# Patient Record
Sex: FEMALE | Race: WHITE | NOT HISPANIC OR LATINO | Employment: UNEMPLOYED | URBAN - NONMETROPOLITAN AREA
[De-identification: names, ages, dates, MRNs, and addresses within clinical notes are randomized per-mention and may not be internally consistent; named-entity substitution may affect disease eponyms.]

---

## 2018-06-11 DIAGNOSIS — R06.02 SOB (SHORTNESS OF BREATH): Primary | ICD-10-CM

## 2018-06-13 ENCOUNTER — OFFICE VISIT (OUTPATIENT)
Dept: PULMONOLOGY | Facility: CLINIC | Age: 58
End: 2018-06-13

## 2018-06-13 VITALS — HEIGHT: 63 IN | BODY MASS INDEX: 31.01 KG/M2 | WEIGHT: 175 LBS

## 2018-06-13 DIAGNOSIS — R04.2 HEMOPTYSIS: Primary | ICD-10-CM

## 2018-06-13 DIAGNOSIS — R91.1 PULMONARY NODULE SEEN ON IMAGING STUDY: ICD-10-CM

## 2018-06-13 DIAGNOSIS — J44.9 CHRONIC OBSTRUCTIVE PULMONARY DISEASE, UNSPECIFIED COPD TYPE (HCC): Primary | ICD-10-CM

## 2018-06-13 LAB
FEV1: 0.81 LITERS
FVC VOL RESPIRATORY: 1.52 LITERS

## 2018-06-13 PROCEDURE — 94010 BREATHING CAPACITY TEST: CPT | Performed by: INTERNAL MEDICINE

## 2018-06-13 PROCEDURE — 99213 OFFICE O/P EST LOW 20 MIN: CPT | Performed by: INTERNAL MEDICINE

## 2018-06-13 ASSESSMENT — PULMONARY FUNCTION TESTS
FVC: 1.52
FEV1: 0.81

## 2018-06-13 NOTE — PROGRESS NOTES
"History of Present Illness 57-year-old lady Referred for evaluation of pulmonary nodule.  She was seen in our office about 4 years ago with COPD and she was advised to quit smoking Take medications that included Advair and Spiriva and return to us in 3 months that she did not 3 significant for smoking at least for 25 years and he quit 12 years ago however started smoking again and currently smokes half a pack a day and she Started coughing up blood for a few days and the daughter the emergency room where they found that nodule in the left upper lobe gave around of Levaquin and referred her to us for further evaluation with her sister      Review of Systems continued cough shortness of breath uses her nebulizer several times a day the Operating Has cleared no chest pain palpitation or edema    Active Problems:  Problem List Items Addressed This Visit     None      Visit Diagnoses     Chronic obstructive pulmonary disease, unspecified COPD type    -  Primary    Pulmonary nodule seen on imaging study              Past Medical History:  No past medical history on file.    Family History:  No family history on file.    Social History:  Social History   Substance Use Topics   • Smoking status: Not on file   • Smokeless tobacco: Not on file   • Alcohol use Not on file       Current Medications:  No current outpatient prescriptions on file.     No current facility-administered medications for this visit.        Allergies:  Allergies not on file    Vitals:  Ht 160 cm (63\")   Wt 79.4 kg (175 lb)   BMI 31.00 kg/m²     Physical Exam chronically ill in no acute distress O2 sat 90 1% expiratory rhonchi heart regular no clubbing cyanosis or edema    Imaging:  The chest x-ray in our system is 4 years ago that was clear for the chest x-ray that she brought on the stated 611 shows a round left upper lobe density that on the CT Is suggestive of infiltrate/nodule    PFT: FVC 47 FEV1 30 3%  No results found for this or any previous " visit.        ASSESSMENT AND DIAGNOSIS:1-COPD severe with recent exacerbation2-left upper lobe infiltrate/nodule that may be The cause of the result of the hemoptysis    Recommendation gave her a sample of Trelegy to take once a day for a month absolutely no smoking seems to be Motivated to quit.  Range for bronchoscopy by Dr. Jasso next week    Follow-Up: Return to the office June 28 with repeat chest x-ray and PFT and the result of a bronchoscopy explained to her sister that she may have lung cancer and certainly And needs to half a bronchoscopy to rule out a bleeding Lesion

## 2018-06-15 ENCOUNTER — HOSPITAL ENCOUNTER (OUTPATIENT)
Facility: HOSPITAL | Age: 58
Setting detail: HOSPITAL OUTPATIENT SURGERY
End: 2018-06-15
Attending: INTERNAL MEDICINE | Admitting: INTERNAL MEDICINE

## 2018-06-15 DIAGNOSIS — R93.89 ABNORMAL CHEST X-RAY: Primary | ICD-10-CM

## 2018-06-18 DIAGNOSIS — R93.89 ABNORMAL CT OF THE CHEST: Primary | ICD-10-CM

## 2018-07-02 DIAGNOSIS — R06.02 SOB (SHORTNESS OF BREATH): Primary | ICD-10-CM

## 2018-07-26 DIAGNOSIS — R06.02 SOB (SHORTNESS OF BREATH): Primary | ICD-10-CM

## 2018-11-02 DIAGNOSIS — R06.02 SOB (SHORTNESS OF BREATH): Primary | ICD-10-CM

## 2018-11-12 DIAGNOSIS — R06.02 SOB (SHORTNESS OF BREATH): Primary | ICD-10-CM

## 2018-11-15 ENCOUNTER — OFFICE VISIT (OUTPATIENT)
Dept: PULMONOLOGY | Facility: CLINIC | Age: 58
End: 2018-11-15

## 2018-11-15 ENCOUNTER — HOSPITAL ENCOUNTER (OUTPATIENT)
Dept: GENERAL RADIOLOGY | Facility: HOSPITAL | Age: 58
Discharge: HOME | End: 2018-11-15
Attending: INTERNAL MEDICINE | Admitting: INTERNAL MEDICINE

## 2018-11-15 VITALS
HEART RATE: 89 BPM | WEIGHT: 165 LBS | BODY MASS INDEX: 29.23 KG/M2 | SYSTOLIC BLOOD PRESSURE: 148 MMHG | TEMPERATURE: 97.3 F | OXYGEN SATURATION: 84 % | HEIGHT: 63 IN | DIASTOLIC BLOOD PRESSURE: 87 MMHG

## 2018-11-15 DIAGNOSIS — J44.9 CHRONIC OBSTRUCTIVE PULMONARY DISEASE, UNSPECIFIED COPD TYPE (HCC): Primary | ICD-10-CM

## 2018-11-15 DIAGNOSIS — J44.9 CHRONIC OBSTRUCTIVE PULMONARY DISEASE, UNSPECIFIED COPD TYPE (HCC): ICD-10-CM

## 2018-11-15 LAB
FEV1: 0.65 LITERS
FVC VOL RESPIRATORY: 1.51 LITERS

## 2018-11-15 PROCEDURE — 71046 X-RAY EXAM CHEST 2 VIEWS: CPT | Performed by: RADIOLOGY

## 2018-11-15 PROCEDURE — 71046 X-RAY EXAM CHEST 2 VIEWS: CPT

## 2018-11-15 PROCEDURE — 94010 BREATHING CAPACITY TEST: CPT | Performed by: INTERNAL MEDICINE

## 2018-11-15 PROCEDURE — 99213 OFFICE O/P EST LOW 20 MIN: CPT | Performed by: INTERNAL MEDICINE

## 2018-11-15 RX ORDER — BUSPIRONE HYDROCHLORIDE 15 MG/1
15 TABLET ORAL 3 TIMES DAILY
COMMUNITY

## 2018-11-15 RX ORDER — HYDROCHLOROTHIAZIDE 25 MG/1
25 TABLET ORAL DAILY
COMMUNITY

## 2018-11-15 RX ORDER — FLUTICASONE PROPIONATE 50 MCG
2 SPRAY, SUSPENSION (ML) NASAL DAILY
COMMUNITY

## 2018-11-15 RX ORDER — GABAPENTIN 600 MG/1
600 TABLET ORAL 3 TIMES DAILY
COMMUNITY

## 2018-11-15 RX ORDER — MONTELUKAST SODIUM 10 MG/1
10 TABLET ORAL NIGHTLY
COMMUNITY

## 2018-11-15 RX ORDER — ALBUTEROL SULFATE 90 UG/1
2 AEROSOL, METERED RESPIRATORY (INHALATION) EVERY 4 HOURS PRN
COMMUNITY

## 2018-11-15 RX ORDER — HYDROCODONE BITARTRATE AND ACETAMINOPHEN 7.5; 325 MG/1; MG/1
1 TABLET ORAL EVERY 6 HOURS PRN
COMMUNITY

## 2018-11-15 RX ORDER — LEVOFLOXACIN 750 MG/1
750 TABLET ORAL DAILY
COMMUNITY

## 2018-11-15 RX ORDER — IBUPROFEN 200 MG
TABLET ORAL
COMMUNITY

## 2018-11-15 RX ORDER — MELOXICAM 7.5 MG/1
15 TABLET ORAL DAILY
COMMUNITY

## 2018-11-15 RX ORDER — PROPRANOLOL HYDROCHLORIDE 10 MG/1
10 TABLET ORAL 2 TIMES DAILY
COMMUNITY

## 2018-11-15 RX ORDER — FENOFIBRATE 54 MG/1
54 TABLET ORAL DAILY
COMMUNITY

## 2018-11-15 RX ORDER — POTASSIUM CHLORIDE 750 MG/1
10 TABLET, FILM COATED, EXTENDED RELEASE ORAL 2 TIMES DAILY
COMMUNITY

## 2018-11-15 RX ORDER — QUETIAPINE FUMARATE 25 MG/1
25 TABLET, FILM COATED ORAL NIGHTLY
COMMUNITY

## 2018-11-15 RX ORDER — ALBUTEROL SULFATE 1.25 MG/3ML
1 SOLUTION RESPIRATORY (INHALATION) EVERY 6 HOURS PRN
COMMUNITY

## 2018-11-15 RX ORDER — METRONIDAZOLE 500 MG/1
500 TABLET ORAL 3 TIMES DAILY
COMMUNITY

## 2018-11-15 RX ORDER — CETIRIZINE HYDROCHLORIDE 10 MG/1
10 TABLET ORAL DAILY
COMMUNITY

## 2018-11-15 RX ORDER — HYDROXYZINE 50 MG/1
50 TABLET, FILM COATED ORAL 3 TIMES DAILY PRN
COMMUNITY

## 2018-11-15 RX ORDER — DULOXETIN HYDROCHLORIDE 60 MG/1
60 CAPSULE, DELAYED RELEASE ORAL DAILY
COMMUNITY

## 2018-11-15 ASSESSMENT — PULMONARY FUNCTION TESTS
FVC: 1.51
FEV1: 0.65

## 2018-11-15 NOTE — PROGRESS NOTES
"History of Present Illness 57-year-old female seen in June OPD) hemoptysis and was scheduled to have a bronchoscopy that did not show up claiming that she ended up having Her appendix removed in July she continues to smoke currently half a pack a day And smoked for 40 years and more than one pack a day for several years and she is taking Advair singular Spiriva and albuterol Inhaler and nebulizer that showed use a couple of times a day.  She uses oxygen at night and during the day.  Into the office to her niece Rosangela      Review of Systems Cough shortness of breath in spite of all the medications and oxygen also having arthritis taking more week For.  Denies cough and \"since June    Active Problems:  Problem List Items Addressed This Visit     None      Visit Diagnoses     Chronic obstructive pulmonary disease, unspecified COPD type (CMS/[facility])    -  Primary    Relevant Medications    fluticasone-salmeterol (ADVAIR) 100-50 MCG/DOSE DISKUS    fluticasone-salmeterol (ADVAIR) 500-50 MCG/DOSE DISKUS    albuterol (ACCUNEB) 1.25 MG/3ML nebulizer solution    cetirizine (zyrTEC) 10 MG tablet    fluticasone (FLONASE) 50 MCG/ACT nasal spray    MethylPREDNISolone (MEDROL, CHRIS, PO)    montelukast (SINGULAIR) 10 MG tablet    albuterol (PROVENTIL HFA;VENTOLIN HFA) 108 (90 Base) MCG/ACT inhaler    Other Relevant Orders    XR Chest 2 View (Completed)    Pulmonary Function Test (Completed)          Past Medical History:  History reviewed. No pertinent past medical history.    Family History:  History reviewed. No pertinent family history.    Social History:  Social History     Tobacco Use   • Smoking status: Current Some Day Smoker     Types: Cigarettes   • Smokeless tobacco: Never Used   Substance Use Topics   • Alcohol use: No     Frequency: Never       Current Medications:  Current Outpatient Medications   Medication Sig Dispense Refill   • albuterol (ACCUNEB) 1.25 MG/3ML nebulizer solution Take 1 ampule by nebulization Every 6 " "(Six) Hours As Needed for Wheezing.     • albuterol (PROVENTIL HFA;VENTOLIN HFA) 108 (90 Base) MCG/ACT inhaler Inhale 2 puffs Every 4 (Four) Hours As Needed for Wheezing.     • busPIRone (BUSPAR) 15 MG tablet Take 15 mg by mouth 3 (Three) Times a Day.     • calcium acetate (PHOSLO) 667 MG capsule Take 1,334 mg by mouth 3 (Three) Times a Day With Meals.     • cetirizine (zyrTEC) 10 MG tablet Take 10 mg by mouth Daily.     • diclofenac (VOLTAREN) 1 % gel gel Apply 4 g topically to the appropriate area as directed 4 (Four) Times a Day As Needed.     • DULoxetine (CYMBALTA) 60 MG capsule Take 60 mg by mouth Daily.     • fenofibrate (TRICOR) 54 MG tablet Take 54 mg by mouth Daily.     • fluticasone (FLONASE) 50 MCG/ACT nasal spray 2 sprays into the nostril(s) as directed by provider Daily.     • fluticasone-salmeterol (ADVAIR) 100-50 MCG/DOSE DISKUS Inhale 2 (Two) Times a Day.     • fluticasone-salmeterol (ADVAIR) 500-50 MCG/DOSE DISKUS Inhale 2 (Two) Times a Day.     • gabapentin (NEURONTIN) 600 MG tablet Take 600 mg by mouth 3 (Three) Times a Day.     • hydrochlorothiazide (HYDRODIURIL) 25 MG tablet Take 25 mg by mouth Daily.     • HYDROcodone-acetaminophen (NORCO) 7.5-325 MG per tablet Take 1 tablet by mouth Every 6 (Six) Hours As Needed for Moderate Pain .     • hydrOXYzine (ATARAX) 50 MG tablet Take 50 mg by mouth 3 (Three) Times a Day As Needed for Itching.     • Insulin Syringe (EASY TOUCH INSULIN SYRINGE) 29G X 1/2\" 1 ML misc      • levoFLOXacin (LEVAQUIN) 750 MG tablet Take 750 mg by mouth Daily.     • meloxicam (MOBIC) 7.5 MG tablet Take 15 mg by mouth Daily.     • MethylPREDNISolone (MEDROL, CHRIS, PO) Take  by mouth.     • metroNIDAZOLE (FLAGYL) 500 MG tablet Take 500 mg by mouth 3 (Three) Times a Day.     • montelukast (SINGULAIR) 10 MG tablet Take 10 mg by mouth Every Night.     • O2 (OXYGEN) Inhale 1 (One) Time.     • potassium chloride (K-DUR) 10 MEQ CR tablet Take 10 mEq by mouth 2 (Two) Times a Day.     • " "propranolol (INDERAL) 10 MG tablet Take 10 mg by mouth 2 (Two) Times a Day.     • QUEtiapine (SEROquel) 25 MG tablet Take 25 mg by mouth Every Night.     • sertraline (ZOLOFT) 50 MG tablet Take 50 mg by mouth Daily.       No current facility-administered medications for this visit.        Allergies:  Allergies   Allergen Reactions   • Penicillins Rash       Vitals:  /87   Pulse 89   Temp 97.3 °F (36.3 °C)   Ht 160 cm (63\")   Wt 74.8 kg (165 lb)   SpO2 (!) 84% Comment: Room Air at rest.  BMI 29.23 kg/m²     Physical Exam chronically ill in no acute distress O2 sat 89% on room air at rest hyperinflated lungs minimal Air passage and expiratory rhonchi heart regular no clubbing cyanosis or joint deformity    Imaging: Send her to get a chest x-ray that showed bilateral interstitial disease no x-ray to compare however she had a CT In June the in middle school that supposedly showed the pulmonary nodule    PFT: FVC 7047 and FEV1 26% indicating severe obstructive impairment  Results for orders placed or performed in visit on 11/15/18   Pulmonary Function Test   Result Value Ref Range    FEV1 0.65 liters    FVC 1.51 liters           ASSESSMENT AND DIAGNOSIS:1-COPD severe because of continued smoking-2-interstitial infiltrates in both lungs in old x-ray to compare3-arthritis per history Unknown type possible rheumatoid the Cause rheumatoid lung disease  Recommendation strongly urged her to quit smoking status oxygen continuously current medications  The patient is not compliant and refuses to take flu shot Rosangela that needs to bring her back in 2 weeks with a copy of old x-ray and CT From [place]   Follow-Up:Return in 2 weeks with repeat PFT                    "

## 2019-02-18 ENCOUNTER — OFFICE VISIT (OUTPATIENT)
Dept: PULMONOLOGY | Facility: CLINIC | Age: 59
End: 2019-02-18

## 2019-02-18 VITALS
OXYGEN SATURATION: 84 % | HEART RATE: 97 BPM | TEMPERATURE: 98.1 F | BODY MASS INDEX: 30.65 KG/M2 | WEIGHT: 173 LBS | HEIGHT: 63 IN | DIASTOLIC BLOOD PRESSURE: 70 MMHG | SYSTOLIC BLOOD PRESSURE: 115 MMHG

## 2019-02-18 DIAGNOSIS — R93.89 ABNORMAL CT OF THE CHEST: ICD-10-CM

## 2019-02-18 DIAGNOSIS — R04.2 HEMOPTYSIS: ICD-10-CM

## 2019-02-18 DIAGNOSIS — J44.9 CHRONIC OBSTRUCTIVE PULMONARY DISEASE, UNSPECIFIED COPD TYPE (HCC): Primary | ICD-10-CM

## 2019-02-18 LAB
FEV1: 0.6 LITERS
FVC VOL RESPIRATORY: 1.15 LITERS

## 2019-02-18 PROCEDURE — 99213 OFFICE O/P EST LOW 20 MIN: CPT | Performed by: INTERNAL MEDICINE

## 2019-02-18 PROCEDURE — 94010 BREATHING CAPACITY TEST: CPT | Performed by: INTERNAL MEDICINE

## 2019-02-18 ASSESSMENT — PULMONARY FUNCTION TESTS
FVC: 1.15
FEV1: 0.60

## 2019-02-18 NOTE — PROGRESS NOTES
Subjective   Chief Complaint   Patient presents with   • COPD       Martine Loco is a 58 y.o. female     History of Present Illness 4-year-old female Previously seen for COPD that was severe and recurrent hemoptysis since  of last year last office visit was November 15 and her chest x-ray showed bilateral interstitial infiltrate and PFT showed severe obstructive impairment and she supposedly Quit smoking 45 days ago she Did not return in 2 weeks per her appointment quit she comes with her sister and wondering about having Pneumonia sitting on 2 L of oxygen continuously and taking Advair singular Spiriva and using Nebulizer several times a day    Review of Systems continued shortness of breath cough expectoration and sometimes bloody sputum pain palpitation or edema no fever chills no difficulty swallowing review of system otherwise noncontributory    History reviewed. No pertinent family history.    History reviewed. No pertinent past medical history.    Past Surgical History:   Procedure Laterality Date   • APPENDECTOMY         Social History     Socioeconomic History   • Marital status:      Spouse name: Not on file   • Number of children: Not on file   • Years of education: Not on file   • Highest education level: Not on file   Social Needs   • Financial resource strain: Not on file   • Food insecurity - worry: Not on file   • Food insecurity - inability: Not on file   • Transportation needs - medical: Not on file   • Transportation needs - non-medical: Not on file   Occupational History   • Not on file   Tobacco Use   • Smoking status: Former Smoker     Years: 50.00     Types: Cigarettes     Last attempt to quit: 1/10/2019     Years since quittin.1   • Smokeless tobacco: Never Used   Substance and Sexual Activity   • Alcohol use: No     Frequency: Never   • Drug use: No   • Sexual activity: Defer   Other Topics Concern   • Not on file   Social History Narrative   • Not on file        Physical  Exam: Chronically ill in no acute distress however O2 sat was 84% on 2 L of oxygen increased at 4 L and it dropped to 96% has a scattered expiratory rhonchi heart is regular no clubbing cyanosis edema or DVT    PFT:  FVC 36 FEV1 24% indicating severe obstructive impairment.    47 and 26% in November 15    Imaging: Last chest x-ray was November 15 showing bilateral interstitial infiltrate no cardiomegaly    Other Labs:       ASSESSMENT COPD severe with question of acute bronchitis with hemoptysis        Recommendations: I told his sister to take her to [place] emergency room and gave her a note to go there and get blood gases chest x-ray and possible CAT scan and possible Bronchoscopy however at the time of dictation this afternoon is no record of any of these in the emergency room and it is possible that she ended up in the [facility].  She is a noncompliant patient that does not come for appointments and is not clear whether she uses her medications appropriately    Follow up: